# Patient Record
Sex: FEMALE | Race: WHITE | NOT HISPANIC OR LATINO | Employment: UNEMPLOYED | URBAN - METROPOLITAN AREA
[De-identification: names, ages, dates, MRNs, and addresses within clinical notes are randomized per-mention and may not be internally consistent; named-entity substitution may affect disease eponyms.]

---

## 2021-01-27 ENCOUNTER — IMMUNIZATIONS (OUTPATIENT)
Dept: FAMILY MEDICINE CLINIC | Facility: HOSPITAL | Age: 76
End: 2021-01-27

## 2021-01-27 DIAGNOSIS — Z23 ENCOUNTER FOR IMMUNIZATION: Primary | ICD-10-CM

## 2021-01-27 PROCEDURE — 0001A SARS-COV-2 / COVID-19 MRNA VACCINE (PFIZER-BIONTECH) 30 MCG: CPT

## 2021-01-27 PROCEDURE — 91300 SARS-COV-2 / COVID-19 MRNA VACCINE (PFIZER-BIONTECH) 30 MCG: CPT

## 2021-02-23 ENCOUNTER — IMMUNIZATIONS (OUTPATIENT)
Dept: FAMILY MEDICINE CLINIC | Facility: HOSPITAL | Age: 76
End: 2021-02-23

## 2021-02-23 DIAGNOSIS — Z23 ENCOUNTER FOR IMMUNIZATION: Primary | ICD-10-CM

## 2021-02-23 PROCEDURE — 91300 SARS-COV-2 / COVID-19 MRNA VACCINE (PFIZER-BIONTECH) 30 MCG: CPT

## 2021-02-23 PROCEDURE — 0002A SARS-COV-2 / COVID-19 MRNA VACCINE (PFIZER-BIONTECH) 30 MCG: CPT

## 2021-05-20 ENCOUNTER — OFFICE VISIT (OUTPATIENT)
Dept: OBGYN CLINIC | Facility: CLINIC | Age: 76
End: 2021-05-20
Payer: COMMERCIAL

## 2021-05-20 ENCOUNTER — APPOINTMENT (OUTPATIENT)
Dept: RADIOLOGY | Facility: CLINIC | Age: 76
End: 2021-05-20
Payer: COMMERCIAL

## 2021-05-20 VITALS
TEMPERATURE: 97.7 F | DIASTOLIC BLOOD PRESSURE: 71 MMHG | HEART RATE: 70 BPM | BODY MASS INDEX: 44.39 KG/M2 | WEIGHT: 260 LBS | SYSTOLIC BLOOD PRESSURE: 134 MMHG | HEIGHT: 64 IN

## 2021-05-20 DIAGNOSIS — M25.571 PAIN, JOINT, ANKLE AND FOOT, RIGHT: ICD-10-CM

## 2021-05-20 DIAGNOSIS — M21.41 PES PLANOVALGUS, ACQUIRED, RIGHT: Primary | ICD-10-CM

## 2021-05-20 DIAGNOSIS — M76.829 PTTD (POSTERIOR TIBIAL TENDON DYSFUNCTION): ICD-10-CM

## 2021-05-20 PROCEDURE — 1036F TOBACCO NON-USER: CPT | Performed by: ORTHOPAEDIC SURGERY

## 2021-05-20 PROCEDURE — 99204 OFFICE O/P NEW MOD 45 MIN: CPT | Performed by: ORTHOPAEDIC SURGERY

## 2021-05-20 PROCEDURE — 1160F RVW MEDS BY RX/DR IN RCRD: CPT | Performed by: ORTHOPAEDIC SURGERY

## 2021-05-20 PROCEDURE — 73610 X-RAY EXAM OF ANKLE: CPT

## 2021-05-20 RX ORDER — FLUTICASONE PROPIONATE 50 MCG
1 SPRAY, SUSPENSION (ML) NASAL DAILY
COMMUNITY

## 2021-05-20 RX ORDER — DILTIAZEM HYDROCHLORIDE 180 MG/1
180 CAPSULE, EXTENDED RELEASE ORAL DAILY
COMMUNITY

## 2021-05-20 RX ORDER — METOPROLOL SUCCINATE 25 MG/1
25 TABLET, EXTENDED RELEASE ORAL DAILY
COMMUNITY

## 2021-05-20 RX ORDER — VALSARTAN 160 MG/1
160 TABLET ORAL DAILY
COMMUNITY

## 2021-05-20 RX ORDER — ALBUTEROL SULFATE 90 UG/1
2 AEROSOL, METERED RESPIRATORY (INHALATION) EVERY 6 HOURS PRN
COMMUNITY

## 2021-05-20 RX ORDER — MULTIVITAMIN
1 CAPSULE ORAL DAILY
COMMUNITY

## 2021-05-20 RX ORDER — GABAPENTIN 600 MG/1
600 TABLET ORAL 3 TIMES DAILY
COMMUNITY

## 2021-05-20 RX ORDER — AMIODARONE HYDROCHLORIDE 100 MG/1
100 TABLET ORAL DAILY
COMMUNITY

## 2021-05-20 NOTE — LETTER
May 21, 2021     Elizabeth Barahona DO  [facility] [address] [address]    Patient: Kevin Thomas   YOB: 1945   Date of Visit: 5/20/2021       Dear Dr Jm Bauer: Thank you for referring Kevin Thomas to me for evaluation  Below are my notes for this consultation  If you have questions, please do not hesitate to call me  I look forward to following your patient along with you  Sincerely,        Ary Spears MD        CC: DO Josep Medrano Pod, PA-C  5/20/2021  4:45 PM  Sign when Signing Visit         JACQUIE Catherine  Attending, Orthopaedic Surgery  [facility] [address] [facility]        ORTHOPAEDIC FOOT AND ANKLE CLINIC VISIT     Assessment:     Encounter Diagnoses   Name Primary?  Pain, joint, ankle and foot, right     PTTD (posterior tibial tendon dysfunction)     Pes planovalgus, acquired, right Yes              Plan:   · The patient verbalized understanding of exam findings and treatment plan  We engaged in the shared decision-making process and treatment options were discussed at length with the patient  Surgical and conservative management discussed today along with risks and benefits  · Gita Pittman has severe pes planovalgus, stage IV deformity with bony prominence of the talar head medially  · We can offer her a pantalar fusion surgery for correction of this deformity  · She has past medical history of A  fib, GI bleed and multiple lower extremity DVTs  Patient states she has not been able to continue Eliquis therapy due to the GI bleed  · She would require 8 weeks of NWB post-operatively, which may not be a possibility for her due to her age and body habitus  · She would need to be medically cleared by her PCP and cardiologist before considering surgery  She will require 6 weeks of anticoagulation for DVT prophylaxis post-operatively  Return if symptoms worsen or fail to improve   We will see her back if she is medically optimized for surgery  History of Present Illness:   Chief Complaint:   Chief Complaint   Patient presents with    Right Ankle - Pain     Reina Eisenberg is a 67 y o  female who is being seen for right pes planovalgus  She has had this problem for years but it is gradually worsening  She has been following with Dr Zan Aschoff DPJACQUIE for this problem, as well as orthopedic surgeons at [facility]  She has been told that she is too high risk for surgery  Pain is localized at lateral ankle and bony prominence medally with minimal radiating and described as sharp and severe  Patient denies numbness, tingling or radicular pain  She does have history of neuropathy, for which she takes gabapentin  Patient does not smoke, does not have diabetes and does not take blood thinners due to history of GI bleed  Patient denies family history of anesthesia complications and has not had any complications with anesthesia  She has had previous DVTs in the lower extremities  Pain/symptom timing:  Worse during the day when active  Pain/symptom context:  Worse with activites and work  Pain/symptom modifying factors:  Rest makes better, activities make worse  Pain/symptom associated signs/symptoms: none    Prior treatment   · NSAIDsNo    · Injections No   · Bracing/Orthotics Yes  - [place] brace without relief  She currently has a carlos brace which has been modified to protect bony prominences  · Physical Therapy No     Orthopedic Surgical History:   See below     Past Medical, Surgical and Social History:  Past Medical History:  has no past medical history on file  Problem List: does not have a problem list on file  Past Surgical History:  has a past surgical history that includes Knee surgery (Bilateral); Thumb arthroscopy; Carpal tunnel release; Shoulder surgery; and Back surgery  Family History: family history is not on file  Social History:  reports that she has never smoked   She has never used smokeless tobacco  She reports that she does not drink alcohol or use drugs  Current Medications: has a current medication list which includes the following prescription(s): albuterol, amiodarone, diltiazem, fluticasone, gabapentin, metoprolol succinate, multivitamin, and valsartan  Allergies: has No Known Allergies  Review of Systems:  General- denies fever/chills  HEENT- denies hearing loss or sore throat  Eyes- denies eye pain or visual disturbances, denies red eyes  Respiratory- denies cough or SOB  Cardio- denies chest pain or palpitations  GI- denies abdominal pain  Endocrine- denies urinary frequency  Urinary- denies pain with urination  Musculoskeletal- Negative except noted above  Skin- denies rashes or wounds  Neurological- denies dizziness or headache  Psychiatric- denies anxiety or difficulty concentrating    Physical Exam:   /71   Pulse 70   Temp 97 7 °F (36 5 °C)   Ht 5' 3 5" (1 613 m)   Wt 118 kg (260 lb)   BMI 45 33 kg/m²   General/Constitutional: No apparent distress: well-nourished and well developed  Eyes: normal ocular motion  Cardio: RRR, Normal S1S2, No m/r/g  Lymphatic: No appreciable lymphadenopathy  Respiratory: Non-labored breathing, CTA b/l no w/c/r  Vascular: No edema, swelling or tenderness, except as noted in detailed exam   Integumentary: No impressive skin lesions present, except as noted in detailed exam   Neuro: No ataxia or tremors noted  Psych: Normal mood and affect, oriented to person, place and time  Appropriate affect  Musculoskeletal: Normal, except as noted in detailed exam and in HPI  Examination    Right    Gait Antalgic   Musculoskeletal Tender to palpation at subfibular area and bony prominence of talar head medially  Severe pes planovalgus deformity present  Skin Normal   No open wounds       Nails Normal    Range of Motion  0 degrees dorsiflexion, 30 degrees plantarflexion  Subtalar motion: not fully correctable hindfoot valgus    Stability Stable Muscle Strength 5/5 tibialis anterior  5/5 gastrocnemius-soleus  5/5 posterior tibialis  5/5 peroneal/eversion strength  5/5 EHL  5/5 FHL    Neurologic Normal    Sensation  Intact to light touch throughout sural, saphenous, superficial peroneal, deep peroneal and medial/lateral plantar nerve distributions  [employer]-Daniel 5 07 filament (10g) testing  deferred  Cardiovascular Brisk capillary refill < 2 seconds,intact DP and PT pulses    Special Tests None      Imaging Studies:   3 views of the right ankle were taken, reviewed and interpreted independently that demonstrate severe pes planovalgus with severe subtalar and talonavicular arthritis  Reviewed by me personally  Scribe Attestation    I,:  Nany Jimenez PA-C am acting as a scribe while in the presence of the attending physician :       I,:  Alberto Vu MD personally performed the services described in this documentation    as scribed in my presence :             Marlena Slay Lachman, MD  Foot & Ankle Surgery   Department of [address] [place] [address]      I personally performed the service  Marlena Slay Lachman, MD

## 2021-05-20 NOTE — PROGRESS NOTES
JACQUIE Abdalla  Attending, Orthopaedic Surgery  [facility] [address] [facility]        ORTHOPAEDIC FOOT AND ANKLE CLINIC VISIT     Assessment:     Encounter Diagnoses   Name Primary?  Pain, joint, ankle and foot, right     PTTD (posterior tibial tendon dysfunction)     Pes planovalgus, acquired, right Yes              Plan:   · The patient verbalized understanding of exam findings and treatment plan  We engaged in the shared decision-making process and treatment options were discussed at length with the patient  Surgical and conservative management discussed today along with risks and benefits  · Yisel Montana has severe pes planovalgus, stage III deformity with bony prominence of the talar head medially  · We can offer her a pantalar fusion surgery for correction of this deformity  · She has past medical history of A  fib, GI bleed and multiple lower extremity DVTs  Patient states she has not been able to continue Eliquis therapy due to the GI bleed  · She would require 8 weeks of NWB post-operatively, which may not be a possibility for her due to her age and body habitus  · She would need to be medically cleared by her PCP and cardiologist before considering surgery  · She will require 6 weeks of anticoagulation for DVT prophylaxis post-operatively  Return if symptoms worsen or fail to improve  We will see her back if she is medically optimized for surgery  History of Present Illness:   Chief Complaint:   Chief Complaint   Patient presents with    Right Ankle - Pain     Wilhelmena Dancer is a 67 y o  female who is being seen for right pes planovalgus  She has had this problem for years but it is gradually worsening  She has been following with Dr Stacey Klinefelter DPM for this problem, as well as orthopedic surgeons at [facility]  She has been told that she is too high risk for surgery    Pain is localized at lateral ankle and bony prominence medally with minimal radiating and described as sharp and severe  Patient denies numbness, tingling or radicular pain  She does have history of neuropathy, for which she takes gabapentin  Patient does not smoke, does not have diabetes and does not take blood thinners due to history of GI bleed  Patient denies family history of anesthesia complications and has not had any complications with anesthesia  She has had previous DVTs in the lower extremities  Pain/symptom timing:  Worse during the day when active  Pain/symptom context:  Worse with activites and work  Pain/symptom modifying factors:  Rest makes better, activities make worse  Pain/symptom associated signs/symptoms: none    Prior treatment   · NSAIDsNo    · Injections No   · Bracing/Orthotics Yes  - [place] brace without relief  She currently has a [place] brace which has been modified to protect bony prominences  · Physical Therapy No     Orthopedic Surgical History:   See below     Past Medical, Surgical and Social History:  Past Medical History:  has no past medical history on file  Problem List: does not have a problem list on file  Past Surgical History:  has a past surgical history that includes Knee surgery (Bilateral); Thumb arthroscopy; Carpal tunnel release; Shoulder surgery; and Back surgery  Family History: family history is not on file  Social History:  reports that she has never smoked  She has never used smokeless tobacco  She reports that she does not drink alcohol or use drugs  Current Medications: has a current medication list which includes the following prescription(s): albuterol, amiodarone, diltiazem, fluticasone, gabapentin, metoprolol succinate, multivitamin, and valsartan  Allergies: has No Known Allergies       Review of Systems:  General- denies fever/chills  HEENT- denies hearing loss or sore throat  Eyes- denies eye pain or visual disturbances, denies red eyes  Respiratory- denies cough or SOB  Cardio- denies chest pain or palpitations  GI- denies abdominal pain  Endocrine- denies urinary frequency  Urinary- denies pain with urination  Musculoskeletal- Negative except noted above  Skin- denies rashes or wounds  Neurological- denies dizziness or headache  Psychiatric- denies anxiety or difficulty concentrating    Physical Exam:   /71   Pulse 70   Temp 97 7 °F (36 5 °C)   Ht 5' 3 5" (1 613 m)   Wt 118 kg (260 lb)   BMI 45 33 kg/m²   General/Constitutional: No apparent distress: well-nourished and well developed  Eyes: normal ocular motion  Cardio: RRR, Normal S1S2, No m/r/g  Lymphatic: No appreciable lymphadenopathy  Respiratory: Non-labored breathing, CTA b/l no w/c/r  Vascular: No edema, swelling or tenderness, except as noted in detailed exam   Integumentary: No impressive skin lesions present, except as noted in detailed exam   Neuro: No ataxia or tremors noted  Psych: Normal mood and affect, oriented to person, place and time  Appropriate affect  Musculoskeletal: Normal, except as noted in detailed exam and in HPI  Examination    Right    Gait Antalgic   Musculoskeletal Tender to palpation at subfibular area and bony prominence of talar head medially  Severe pes planovalgus deformity present  Skin Normal   No open wounds  Nails Normal    Range of Motion  0 degrees dorsiflexion, 30 degrees plantarflexion  Subtalar motion: not fully correctable hindfoot valgus    Stability Stable    Muscle Strength 5/5 tibialis anterior  5/5 gastrocnemius-soleus  5/5 posterior tibialis  5/5 peroneal/eversion strength  5/5 EHL  5/5 FHL    Neurologic Normal    Sensation  Intact to light touch throughout sural, saphenous, superficial peroneal, deep peroneal and medial/lateral plantar nerve distributions  [employer]-Daniel 5 07 filament (10g) testing  deferred      Cardiovascular Brisk capillary refill < 2 seconds,intact DP and PT pulses    Special Tests None      Imaging Studies:   3 views of the right ankle were taken, reviewed and interpreted independently that demonstrate severe pes planovalgus with severe subtalar and talonavicular arthritis  Reviewed by me personally  Scribe Attestation    I,:  Hany Kc PA-C am acting as a scribe while in the presence of the attending physician :       I,:  Helen Murray MD personally performed the services described in this documentation    as scribed in my presence :             Isiah Hoyer Lachman, MD  Foot & Ankle Surgery   Department of [address] [place] [address]      I personally performed the service  Isiah Hoyer Lachman, MD

## 2021-05-20 NOTE — PATIENT INSTRUCTIONS
We can offer you a pantalar fusion for your foot deformity if you are medically cleared for surgery by your PCP and cardiologist      The surgery will take approximately 2 hours

## 2024-04-12 ENCOUNTER — HOSPITAL ENCOUNTER (OUTPATIENT)
Dept: HOSPITAL 99 - DHSLP | Age: 79
End: 2024-04-12
Payer: COMMERCIAL

## 2024-04-12 DIAGNOSIS — R09.02: ICD-10-CM

## 2024-04-12 DIAGNOSIS — G47.33: Primary | ICD-10-CM

## 2024-04-12 DIAGNOSIS — G47.61: ICD-10-CM

## 2025-08-11 ENCOUNTER — HOSPITAL ENCOUNTER (EMERGENCY)
Age: 80
Discharge: HOME | End: 2025-08-11
Attending: EMERGENCY MEDICINE | Admitting: EMERGENCY MEDICINE
Payer: COMMERCIAL

## 2025-08-11 ENCOUNTER — APPOINTMENT (EMERGENCY)
Age: 80
End: 2025-08-11
Payer: COMMERCIAL